# Patient Record
Sex: MALE | Race: BLACK OR AFRICAN AMERICAN | Employment: FULL TIME | URBAN - METROPOLITAN AREA
[De-identification: names, ages, dates, MRNs, and addresses within clinical notes are randomized per-mention and may not be internally consistent; named-entity substitution may affect disease eponyms.]

---

## 2019-06-18 ENCOUNTER — HOSPITAL ENCOUNTER (EMERGENCY)
Age: 39
Discharge: HOME | End: 2019-06-18
Attending: EMERGENCY MEDICINE
Payer: COMMERCIAL

## 2019-06-18 VITALS
TEMPERATURE: 98.3 F | OXYGEN SATURATION: 100 % | SYSTOLIC BLOOD PRESSURE: 140 MMHG | HEART RATE: 81 BPM | WEIGHT: 214 LBS | DIASTOLIC BLOOD PRESSURE: 82 MMHG | RESPIRATION RATE: 16 BRPM | BODY MASS INDEX: 27.46 KG/M2 | HEIGHT: 74 IN

## 2019-06-18 DIAGNOSIS — M79.10 MYALGIA: ICD-10-CM

## 2019-06-18 DIAGNOSIS — J02.0 STREPTOCOCCAL SORE THROAT: ICD-10-CM

## 2019-06-18 DIAGNOSIS — R05.9 COUGH: Primary | ICD-10-CM

## 2019-06-18 LAB
DIRECT EXAM: ABNORMAL
DIRECT EXAM: NORMAL
Lab: ABNORMAL
Lab: NORMAL
SPECIMEN DESCRIPTION: ABNORMAL
SPECIMEN DESCRIPTION: NORMAL

## 2019-06-18 PROCEDURE — 99283 EMERGENCY DEPT VISIT LOW MDM: CPT

## 2019-06-18 PROCEDURE — 6370000000 HC RX 637 (ALT 250 FOR IP): Performed by: STUDENT IN AN ORGANIZED HEALTH CARE EDUCATION/TRAINING PROGRAM

## 2019-06-18 PROCEDURE — 96372 THER/PROPH/DIAG INJ SC/IM: CPT

## 2019-06-18 PROCEDURE — 87880 STREP A ASSAY W/OPTIC: CPT

## 2019-06-18 PROCEDURE — 87804 INFLUENZA ASSAY W/OPTIC: CPT

## 2019-06-18 PROCEDURE — 6360000002 HC RX W HCPCS: Performed by: STUDENT IN AN ORGANIZED HEALTH CARE EDUCATION/TRAINING PROGRAM

## 2019-06-18 RX ORDER — IBUPROFEN 400 MG/1
600 TABLET ORAL ONCE
Status: COMPLETED | OUTPATIENT
Start: 2019-06-18 | End: 2019-06-18

## 2019-06-18 RX ADMIN — PENICILLIN G BENZATHINE AND PENICILLIN G PROCAINE 1.2 MILLION UNITS: 600000; 600000 INJECTION, SUSPENSION INTRAMUSCULAR at 11:16

## 2019-06-18 RX ADMIN — IBUPROFEN 600 MG: 400 TABLET, FILM COATED ORAL at 10:21

## 2019-06-18 ASSESSMENT — ENCOUNTER SYMPTOMS
EYE DISCHARGE: 0
PHOTOPHOBIA: 0
SORE THROAT: 1
CHOKING: 0
COUGH: 1
CHEST TIGHTNESS: 0
NAUSEA: 0
VOICE CHANGE: 0
CONSTIPATION: 0
FACIAL SWELLING: 0
SHORTNESS OF BREATH: 0
ABDOMINAL DISTENTION: 0
WHEEZING: 0
ABDOMINAL PAIN: 0
RHINORRHEA: 0
DIARRHEA: 0
APNEA: 0
STRIDOR: 0
VOMITING: 0
SINUS PAIN: 0

## 2019-06-18 ASSESSMENT — PAIN SCALES - GENERAL
PAINLEVEL_OUTOF10: 5
PAINLEVEL_OUTOF10: 5

## 2019-06-18 ASSESSMENT — PAIN DESCRIPTION - LOCATION: LOCATION: THROAT

## 2019-06-18 ASSESSMENT — PAIN DESCRIPTION - PAIN TYPE: TYPE: ACUTE PAIN

## 2019-06-18 ASSESSMENT — PAIN DESCRIPTION - DESCRIPTORS: DESCRIPTORS: SORE

## 2019-06-18 ASSESSMENT — PAIN DESCRIPTION - FREQUENCY: FREQUENCY: CONTINUOUS

## 2019-06-18 NOTE — ED PROVIDER NOTES
[facility]     Emergency Department     Faculty Attestation    I performed a history and physical examination of the patient and discussed management with the resident. I reviewed the resident´s note and agree with the documented findings and plan of care. Any areas of disagreement are noted on the chart. I was personally present for the key portions of any procedures. I have documented in the chart those procedures where I was not present during the key portions. I have reviewed the emergency nurses triage note. I agree with the chief complaint, past medical history, past surgical history, allergies, medications, social and family history as documented unless otherwise noted below. For Physician Assistant/ Nurse Practitioner cases/documentation I have personally evaluated this patient and have completed at least one if not all key elements of the E/M (history, physical exam, and MDM). Additional findings are as noted. chest clear, heart exam normal, mild erythema posterior pharynx with symmetrical swelling and no signs of peritonsillar abscess, normal voice, no drooling, no sublingual swelling, bilateral anterior cervical lymphadenopathy, no rash or meningeal signs. No pain to palpation of spleen. Neuro intact, no facial swelling. Pt does not appear ill. Plan is for strep screen.     Kaia Kay MD [address] [facility],[address]  Attending Physician         Placido Woods MD  06/18/19 9876

## 2019-06-18 NOTE — ED PROVIDER NOTES
[facility]  EMERGENCY DEPARTMENT ENCOUNTER  RESIDENT    Pt Name: Giselle Query  MRN: [id number]  Raphaelgfkerline 1980  Date of evaluation: 6/18/2019  PCP:  Edvin Lester MD    [address] [place] [address]       Chief Complaint   Patient presents with    Pharyngitis     x3 days    Generalized Body Aches         HISTORY OF PRESENT ILLNESS    Loc Hickman is a 37 y.o. male with a history of cluster migraines who presents with sore throat and myalgias for 3 days. Patient denies fevers, chills, night sweats, nausea or vomiting. Patient states that he has intermittent cough only when he is trying to force up sputum. Clear sputum. Has not noticed lymphadenopathy at this time. No sick contacts. No known allergies. Has not had his flu shot. REVIEW OF SYSTEMS       Review of Systems   Constitutional: Positive for fatigue. Negative for activity change, appetite change, chills, diaphoresis, fever and unexpected weight change. HENT: Positive for sore throat. Negative for congestion, drooling, ear discharge, ear pain, facial swelling, nosebleeds, postnasal drip, rhinorrhea, sinus pain, sneezing and voice change. Eyes: Negative for photophobia and discharge. Respiratory: Positive for cough. Negative for apnea, choking, chest tightness, shortness of breath, wheezing and stridor. Cardiovascular: Negative for chest pain, palpitations and leg swelling. Gastrointestinal: Negative for abdominal distention, abdominal pain, constipation, diarrhea, nausea and vomiting. Genitourinary: Negative for dysuria, enuresis, flank pain, frequency, hematuria and urgency. Neurological: Negative for dizziness, seizures, syncope, facial asymmetry, weakness, light-headedness, numbness and headaches. Hematological: Negative for adenopathy. PAST MEDICAL HISTORY    has a past medical history of Chest pain, ED (erectile dysfunction), and Headache(784.0).       SURGICAL HISTORY      has no past surgical history on file. CURRENT MEDICATIONS       Previous Medications    VARENICLINE (CHANTIX CONTINUING MONTH ANNELIESE) 1 MG TABLET    Take 1 tablet by mouth 2 times daily       ALLERGIES     is allergic to codeine. FAMILY HISTORY   has no family status information on file. family history includes High Blood Pressure in his maternal grandmother and mother. SOCIAL HISTORY      reports that he has been smoking. He has a 15.00 pack-year smoking history. He does not have any smokeless tobacco history on file. He reports that he does not drink alcohol or use drugs. PHYSICAL EXAM     INITIAL VITALS:  height is 6' 2\" (1.88 m) and weight is 214 lb (97.1 kg). His oral temperature is 98.3 °F (36.8 °C). His blood pressure is 140/82 (abnormal) and his pulse is 81. His respiration is 16 and oxygen saturation is 100%. Physical Exam   Constitutional: He is oriented to person, place, and time. Vital signs are normal. He appears well-developed and well-nourished. He is active. He does not appear ill. No distress. HENT:   Head: Normocephalic and atraumatic. Right Ear: External ear normal.   Left Ear: External ear normal.   Eyes: Pupils are equal, round, and reactive to light. Conjunctivae and EOM are normal. Right eye exhibits no discharge. Left eye exhibits no discharge. No scleral icterus. Neck: Normal range of motion. Pulmonary/Chest: Effort normal.   Abdominal: Soft. He exhibits no distension. There is no tenderness. There is no guarding. Musculoskeletal: Normal range of motion. He exhibits no edema, tenderness or deformity. Neurological: He is alert and oriented to person, place, and time. He has normal strength. No cranial nerve deficit. GCS eye subscore is 4. GCS verbal subscore is 5. GCS motor subscore is 6. Skin: Skin is warm and dry. Capillary refill takes less than 2 seconds. No rash noted. He is not diaphoretic. No erythema. Psychiatric: He has a normal mood and affect.  His behavior is normal. Nursing note and vitals reviewed. DIFFERENTIAL DIAGNOSIS:   Strep throat versus mono versus influenza versus URI of unknown viral etiology    DIAGNOSTIC RESULTS     EKG: All EKG's are interpreted by the Emergency Department Physician who either signs or Co-signs thischart in the absence of a cardiologist.      RADIOLOGY:   I directly visualized the following  imagesand reviewed the radiologist interpretations:  No orders to display           LABS:  Labs Reviewed   STREP SCREEN GROUP A THROAT - Abnormal; Notable for the following components:       Result Value    Direct Exam POSITIVE for Group A Streptococci (*)     All other components within normal limits   RAPID INFLUENZA A/B ANTIGENS             EMERGENCY DEPARTMENT COURSE:   Vitals:    Vitals:    06/18/19 1009   BP: (!) 140/82   Pulse: 81   Resp: 16   Temp: 98.3 °F (36.8 °C)   TempSrc: Oral   SpO2: 100%   Weight: 214 lb (97.1 kg)   Height: 6' 2\" (1.88 m)       Administered ibuprofen, flu swab, strep screen  Will reassess in 15 minutes  Rapid strep positive for group A strep  Bicillin C-R 1,200,000 units IM once-patient is agreeable to it                CONSULTS:  None      PROCEDURES:  Procedures        FINAL IMPRESSION      1. Cough    2. Viral syndrome    3. Myalgia    4. Viral pharyngitis            DISPOSITION/PLAN   DISPOSITION            PATIENT REFERRED TO:  No follow-up provider specified.     DISCHARGE MEDICATIONS:  New Prescriptions    No medications on file       (Please note that portions of this note were completed with a voice recognition program.  Efforts weremade to edit the dictations but occasionally words are mis-transcribed.)    Chad Trejo MD, [facility]  PGY-1 Resident              Chad Trejo  Resident  06/18/19 2031

## 2019-06-18 NOTE — ED NOTES
Patient to ed with c/o sore throat for three days and body aches starting on Sunday. Patient denies fever but states he had some chills. Patient denies taking any otc medication for this. Patient denies any N/V/D. Patient denies any cough or sob. Patient denies being around anyone sick.      Vero Estrada RN  06/18/19 8731

## 2023-04-18 ENCOUNTER — OFFICE VISIT (OUTPATIENT)
Dept: NEUROLOGY | Age: 43
End: 2023-04-18
Payer: MEDICAID

## 2023-04-18 VITALS
BODY MASS INDEX: 30.93 KG/M2 | WEIGHT: 241 LBS | HEIGHT: 74 IN | SYSTOLIC BLOOD PRESSURE: 129 MMHG | DIASTOLIC BLOOD PRESSURE: 79 MMHG | OXYGEN SATURATION: 98 % | HEART RATE: 80 BPM

## 2023-04-18 DIAGNOSIS — G44.009 CLUSTER HEADACHE, NOT INTRACTABLE, UNSPECIFIED CHRONICITY PATTERN: Primary | ICD-10-CM

## 2023-04-18 PROCEDURE — 99214 OFFICE O/P EST MOD 30 MIN: CPT | Performed by: STUDENT IN AN ORGANIZED HEALTH CARE EDUCATION/TRAINING PROGRAM

## 2023-04-18 RX ORDER — SUMATRIPTAN 100 MG/1
TABLET, FILM COATED ORAL
COMMUNITY
Start: 2014-01-20

## 2023-04-18 RX ORDER — LIDOCAINE HYDROCHLORIDE 10 MG/ML
5 INJECTION, SOLUTION INFILTRATION; PERINEURAL ONCE
Qty: 5 ML | Refills: 0 | Status: SHIPPED | OUTPATIENT
Start: 2023-04-18 | End: 2023-04-18

## 2023-04-18 RX ORDER — ASPIRIN 81 MG/1
81 TABLET, CHEWABLE ORAL PRN
COMMUNITY

## 2023-04-18 RX ORDER — TRIAMCINOLONE ACETONIDE 10 MG/ML
40 INJECTION, SUSPENSION INTRA-ARTICULAR; INTRALESIONAL ONCE
Qty: 4 ML | Refills: 0 | Status: SHIPPED | OUTPATIENT
Start: 2023-04-18 | End: 2023-04-18

## 2023-04-18 RX ORDER — ZOLMITRIPTAN 5 MG/1
SPRAY NASAL
COMMUNITY
Start: 2019-08-05

## 2023-04-18 RX ORDER — VERAPAMIL HYDROCHLORIDE 240 MG/1
1 CAPSULE, EXTENDED RELEASE ORAL
COMMUNITY
Start: 2018-11-29

## 2023-04-18 RX ORDER — DICLOFENAC POTASSIUM 50 MG/1
POWDER, FOR SOLUTION ORAL
COMMUNITY
Start: 2018-11-14

## 2023-04-18 RX ORDER — MAGNESIUM OXIDE 400 MG/1
TABLET ORAL
COMMUNITY
Start: 2018-11-28

## 2023-04-18 ASSESSMENT — ENCOUNTER SYMPTOMS
ABDOMINAL PAIN: 0
PHOTOPHOBIA: 1
EYE REDNESS: 1

## 2023-04-18 NOTE — PROGRESS NOTES
IV, VI - extra-ocular muscles full: no pupillary defect; no BONG, no nystagmus, left eye ptosis. V - normal facial sensation                                                               VII - normal facial symmetry                                                             VIII - intact hearing                                                                             IX, X - symmetrical palate                                                                  XI - symmetrical shoulder shrug                                                       XII - midline tongue without atrophy or fasciculation     Motor function  Normal muscle bulk and tone  Muscle strength: normal power 5/5  fine motor movements     Sensory function Intact to touch, pin prick, vibration, proprioception in bilateral upper and lower extremities. Cerebellar Intact fine motor movement. No involuntary movements or tremors     Reflex function Intact 2+ DTR and symmetric. Negative Babinski     Gait                  Normal station and gait           PRIOR TESTS AND IMAGING: Following images and Labs were reviewed by the examiner     MRI brain 2018: Unremarkable for any acute abnormality  MRA head and neck: Unremarkable for any abnormality. ASSESSMENT       Damion Hendricks Baumgarten 77-year-old male presents for evaluation of cluster headaches. History of chronic cluster headaches    PLAN:     Patient is already following with a different neurologist at [facility] outpatient with current regimen  Recommend to continue Emgality 300 mg at the onset of cluster. And then once monthly. Continue intranasal zolmitriptan 5 m puff at the onset of cluster headaches. May repeat after 2hr x 1. Max dose 2 doses in 24 hours. Will give occipital nerve blocks on 2023 once for now.   Patient has referral for occipital nerve blocks from his other neurologist.  Discussed with patient that in the long run patient can follow-up with his primary neurologist

## 2023-04-19 ENCOUNTER — OFFICE VISIT (OUTPATIENT)
Dept: NEUROLOGY | Age: 43
End: 2023-04-19

## 2023-04-19 VITALS
TEMPERATURE: 97.2 F | SYSTOLIC BLOOD PRESSURE: 129 MMHG | DIASTOLIC BLOOD PRESSURE: 79 MMHG | WEIGHT: 241 LBS | OXYGEN SATURATION: 98 % | BODY MASS INDEX: 30.93 KG/M2 | HEART RATE: 80 BPM | HEIGHT: 74 IN

## 2023-04-19 DIAGNOSIS — G44.009 CLUSTER HEADACHE, NOT INTRACTABLE, UNSPECIFIED CHRONICITY PATTERN: Primary | ICD-10-CM

## 2023-04-19 NOTE — PROGRESS NOTES
Procedure Note    Procedure: Left side Greater and Lesser Occipital Nerve Block (CPT code 73846, 09623)    Indications:  Cluster headaches     Informed consent was obtained (explaining the procedure and risks and benefits) from patient. The signed consent form was placed in the medical record. A time out was completed, verifying correct patient, procedure,site, positioning, and implants or special equipment. Patient's left occipital area was palpated to identify location of the greater and the lesser occipital nerve. Alcohol was applied topically to the skin. Using a 27 gauge needle (aspirating during insertion), 4 ml of 1% lidocaine (from a 20 ml bottle) and trimacinolone 1cc (40mg/ml( was aspirated. 2 ml was injected on the greater and lesser occipital nerve on the left side (directing needle to center, left and right of painful focus). Pressure with a gauze pad was held briefly upon the site of puncture to minimize bleeding and to further spread anaesthetic subcutaneously. There were no complications. Patient was comfortable and left without complaint. Procedure was performed with Dr. Tonny Freitas.

## 2023-04-19 NOTE — PROGRESS NOTES
[facility] [address] [place] [address]  Phone: [phone]    Procedure Note    Procedure: Left side Greater and Lesser Occipital Nerve Block (CPT code 76886, 72573)    Indications:  Cluster headaches     Informed consent was obtained (explaining the procedure and risks and benefits) from patient. The signed consent form was placed in the medical record. A time out was completed, verifying correct patient, procedure,site, positioning, and implants or special equipment. Patient's left occipital area was palpated to identify location of the greater and the lesser occipital nerve. Alcohol was applied topically to the skin. Using a 27 gauge needle (aspirating during insertion), 4 ml of 1% lidocaine (from a 20 ml bottle) and trimacinolone 1cc (40mg/ml( was aspirated. 2 ml was injected on the greater and lesser occipital nerve on the left side (directing needle to center, left and right of painful focus). Pressure with a gauze pad was held briefly upon the site of puncture to minimize bleeding and to further spread anaesthetic subcutaneously. There were no complications. Patient was comfortable and left without complaint. Procedure was performed with Dr. Sadiq Solorio.

## 2023-04-25 ENCOUNTER — TELEPHONE (OUTPATIENT)
Dept: NEUROLOGY | Age: 43
End: 2023-04-25

## 2023-10-25 ENCOUNTER — OFFICE VISIT (OUTPATIENT)
Dept: NEUROLOGY | Age: 43
End: 2023-10-25

## 2023-10-25 VITALS
HEART RATE: 77 BPM | HEIGHT: 74 IN | SYSTOLIC BLOOD PRESSURE: 137 MMHG | BODY MASS INDEX: 30.93 KG/M2 | OXYGEN SATURATION: 98 % | RESPIRATION RATE: 16 BRPM | WEIGHT: 241 LBS | DIASTOLIC BLOOD PRESSURE: 82 MMHG

## 2023-10-25 DIAGNOSIS — G44.029 CHRONIC CLUSTER HEADACHE, NOT INTRACTABLE: Primary | ICD-10-CM

## 2023-10-25 RX ORDER — ACETAMINOPHEN 500 MG
500 TABLET ORAL EVERY 6 HOURS PRN
COMMUNITY

## 2023-10-25 RX ORDER — TOPIRAMATE 25 MG/1
25 TABLET ORAL DAILY
Qty: 60 TABLET | Refills: 4 | Status: SHIPPED | OUTPATIENT
Start: 2023-10-25 | End: 2024-08-20

## 2023-10-25 RX ORDER — INDOMETHACIN 75 MG/1
CAPSULE, EXTENDED RELEASE ORAL
COMMUNITY
Start: 2023-10-01

## 2023-10-25 ASSESSMENT — ENCOUNTER SYMPTOMS
VOICE CHANGE: 0
WHEEZING: 0
CHEST TIGHTNESS: 0
VOMITING: 0
COLOR CHANGE: 0
TROUBLE SWALLOWING: 0
PHOTOPHOBIA: 0
NAUSEA: 0
SHORTNESS OF BREATH: 0

## 2023-10-25 NOTE — PATIENT INSTRUCTIONS
Start taking Topamax 25 mg nightly for the 1st week.      2nd week: 25 mg in AM and 25 mg in PM   3rd week: 25 mg in AM and 50 mg in PM   4th week: 50 mg in AM and 50 mg in PM   5th week: 50 mg in AM and 75 mg in PM   6th week: 75 mg in AM and 75 mg in PM    Please keep track of your symptoms by using a headache journal.

## 2023-10-25 NOTE — PROGRESS NOTES
XI - symmetrical shoulder shrug                                                       XII - midline tongue without atrophy or fasciculation     Motor function  Normal muscle bulk and tone  Muscle strength: normal power 5/5  fine motor movements     Sensory function Intact to touch, pin prick, vibration, proprioception in bilateral upper and lower extremities. Cerebellar Intact fine motor movement. No involuntary movements or tremors     Reflex function Intact 2+ DTR and symmetric. Negative Babinski     Gait                  Normal station and gait           PRIOR TESTS AND IMAGING: Following images and Labs were reviewed by the examiner             ASSESSMENT       Left-sided cluster headaches    42-year-old male with past medical history of recalcitrant cluster headaches on Emgality, indomethacin, and oxygen presents to neurology clinic for further evaluation of cluster headaches. Patient states Emgality has decreased in effectiveness. He has not tried Topamax in the past, which can be used for treatment of cluster headaches. He is reluctant to try lithium due to narrow therapeutic index, frequent lab checks, and possibility of side effects. He was educated on the side effects of Topamax including mental fog, decreased concentration, weight loss, decreased appetite, and increased risk of kidney stones. PLAN:     Start Topamax at 25 mg per night, and plan to increase by 25 mg every week for another dose of 75 mg twice daily. Continue oxygen and Emgality for abortive treatment. Continue Imitrex for abortive treatment. Continue indomethacin for preventative treatment. Follow up in the clinic in 3 months. Instructed patient to call the clinic if symptoms worsen or develop any new symptoms. Case discussed with attending, Dr. Mateo Garrett.      Mr. Mando Delgado received counseling on the following healthy behaviors: medical compliance, smoking

## 2024-01-31 ENCOUNTER — OFFICE VISIT (OUTPATIENT)
Dept: NEUROLOGY | Age: 44
End: 2024-01-31

## 2024-01-31 VITALS
SYSTOLIC BLOOD PRESSURE: 132 MMHG | WEIGHT: 212 LBS | HEIGHT: 74 IN | HEART RATE: 57 BPM | DIASTOLIC BLOOD PRESSURE: 77 MMHG | BODY MASS INDEX: 27.21 KG/M2

## 2024-01-31 DIAGNOSIS — G44.029 CHRONIC CLUSTER HEADACHE, NOT INTRACTABLE: Primary | ICD-10-CM

## 2024-01-31 NOTE — PROGRESS NOTES
[address] [facility] [address]  [place] [address]  P: [phone]  F: [phone]    NEUROLOGY CLINIC NOTE     PATIENT NAME: Dexter Paiz  PATIENT MRN: [id number]  PRIMARY CARE PHYSICIAN: Mary Salomon MD    HPI:      Dexter Paiz is a 43 y.o. right-handed -American male with past medical history of cluster headaches presents to the neurology clinic for follow-up.    As per the chart review, patient has been having left-sided headaches since 4077-8399, located right behind the eye, and has been worsening in intensity and increasing in frequency.  Headache is located in the left side, retro-orbital, quality is described as sharp and sometimes stabbing, associated factors of nausea, vomiting, photophobia, and phonophobia.  Intensity of 10/10, and there are no preceding aura symptoms.  Patient typically gets 15 days of headache per month.  Relieving factors including going to sleep, staying in a dark room.  He takes Emgality 300 mg at the onset of cluster headache.,  And then once monthly until the end of the cluster headaches.  Patient also takes intranasal zolmitriptan 5 mg for abortive treatment.  Patient was last seen on 4/18/2023, and received occipital nerve block on the left side on 4/19/2023.    Patient's cluster headache started on 7238-5373.  Patient has tried multiple medications in the past, including DHE for acute treatment, Depakote, and verapamil.  His cluster headaches typically occur during weather changes, and fluctuate every year to 1.5 years.  Patient's current symptoms started in March 2023, and initially started occurring every night with minimal improvement.  He received occipital nerve block in April 2023, and more recently in the beginning of October 2023 on left side of occiput with moderate improvement.  However, symptoms returned, and are persistent.  As of now, patient experiences 3 headache episodes per week, and all

## 2024-09-04 ENCOUNTER — APPOINTMENT (OUTPATIENT)
Dept: GENERAL RADIOLOGY | Age: 44
End: 2024-09-04
Payer: COMMERCIAL

## 2024-09-04 ENCOUNTER — HOSPITAL ENCOUNTER (EMERGENCY)
Age: 44
Discharge: HOME | End: 2024-09-04
Payer: COMMERCIAL

## 2024-09-04 VITALS
WEIGHT: 206 LBS | SYSTOLIC BLOOD PRESSURE: 131 MMHG | BODY MASS INDEX: 26.45 KG/M2 | DIASTOLIC BLOOD PRESSURE: 96 MMHG | TEMPERATURE: 99 F | RESPIRATION RATE: 16 BRPM | HEART RATE: 82 BPM | OXYGEN SATURATION: 99 %

## 2024-09-04 DIAGNOSIS — U07.1 COVID-19: Primary | ICD-10-CM

## 2024-09-04 LAB
ALBUMIN SERPL-MCNC: 3.6 G/DL (ref 3.5–5.2)
ALP SERPL-CCNC: 181 U/L (ref 40–129)
ALT SERPL-CCNC: 49 U/L (ref 5–41)
ANION GAP SERPL CALCULATED.3IONS-SCNC: 11 MMOL/L (ref 9–17)
AST SERPL-CCNC: 41 U/L
ATYPICAL LYMPHOCYTE ABSOLUTE COUNT: 0.43 K/UL
ATYPICAL LYMPHOCYTES: 3 %
BASOPHILS # BLD: 0.14 K/UL (ref 0–0.2)
BASOPHILS NFR BLD: 1 %
BILIRUB SERPL-MCNC: 0.4 MG/DL (ref 0.3–1.2)
BUN SERPL-MCNC: 7 MG/DL (ref 6–20)
BUN/CREAT SERPL: 9 (ref 9–20)
CALCIUM SERPL-MCNC: 8.8 MG/DL (ref 8.6–10.4)
CHLORIDE SERPL-SCNC: 102 MMOL/L (ref 98–107)
CO2 SERPL-SCNC: 24 MMOL/L (ref 20–31)
CREAT SERPL-MCNC: 0.8 MG/DL (ref 0.7–1.2)
EOSINOPHIL # BLD: 0.14 K/UL (ref 0–0.4)
EOSINOPHILS RELATIVE PERCENT: 1 % (ref 1–4)
ERYTHROCYTE [DISTWIDTH] IN BLOOD BY AUTOMATED COUNT: 14.7 % (ref 11.8–14.4)
FLUAV RNA RESP QL NAA+PROBE: NOT DETECTED
FLUBV RNA RESP QL NAA+PROBE: NOT DETECTED
GFR, ESTIMATED: >90 ML/MIN/1.73M2
GLUCOSE SERPL-MCNC: 82 MG/DL (ref 70–99)
HCT VFR BLD AUTO: 43.6 % (ref 40.7–50.3)
HGB BLD-MCNC: 14.6 G/DL (ref 13–17)
IMM GRANULOCYTES # BLD AUTO: 0 K/UL (ref 0–0.3)
IMM GRANULOCYTES NFR BLD: 0 %
LIPASE SERPL-CCNC: 19 U/L (ref 13–60)
LYMPHOCYTES NFR BLD: 10.3 K/UL (ref 1–4.8)
LYMPHOCYTES RELATIVE PERCENT: 72 % (ref 24–44)
MAGNESIUM SERPL-MCNC: 2.2 MG/DL (ref 1.6–2.6)
MCH RBC QN AUTO: 29.7 PG (ref 25.2–33.5)
MCHC RBC AUTO-ENTMCNC: 33.5 G/DL (ref 28.4–34.8)
MCV RBC AUTO: 88.6 FL (ref 82.6–102.9)
MONOCYTES NFR BLD: 1.14 K/UL (ref 0.2–0.8)
MONOCYTES NFR BLD: 8 % (ref 1–7)
NEUTROPHILS NFR BLD: 15 % (ref 36–66)
NEUTS SEG NFR BLD: 2.15 K/UL (ref 1.8–7.7)
NRBC BLD-RTO: 0 PER 100 WBC
PLATELET # BLD AUTO: 182 K/UL (ref 138–453)
PMV BLD AUTO: 8.9 FL (ref 8.1–13.5)
POTASSIUM SERPL-SCNC: 4.6 MMOL/L (ref 3.7–5.3)
PROT SERPL-MCNC: 7.3 G/DL (ref 6.4–8.3)
RBC # BLD AUTO: 4.92 M/UL (ref 4.21–5.77)
SARS-COV-2 RNA RESP QL NAA+PROBE: DETECTED
SODIUM SERPL-SCNC: 137 MMOL/L (ref 135–144)
SOURCE: ABNORMAL
SPECIMEN DESCRIPTION: ABNORMAL
SPECIMEN SOURCE: NORMAL
STREP A, MOLECULAR: NEGATIVE
WBC OTHER # BLD: 14.3 K/UL (ref 3.5–11.3)

## 2024-09-04 PROCEDURE — 83735 ASSAY OF MAGNESIUM: CPT

## 2024-09-04 PROCEDURE — 99284 EMERGENCY DEPT VISIT MOD MDM: CPT

## 2024-09-04 PROCEDURE — 6370000000 HC RX 637 (ALT 250 FOR IP)

## 2024-09-04 PROCEDURE — 87651 STREP A DNA AMP PROBE: CPT

## 2024-09-04 PROCEDURE — 2580000003 HC RX 258

## 2024-09-04 PROCEDURE — 80053 COMPREHEN METABOLIC PANEL: CPT

## 2024-09-04 PROCEDURE — 83690 ASSAY OF LIPASE: CPT

## 2024-09-04 PROCEDURE — 71045 X-RAY EXAM CHEST 1 VIEW: CPT

## 2024-09-04 PROCEDURE — 87636 SARSCOV2 & INF A&B AMP PRB: CPT

## 2024-09-04 PROCEDURE — 85025 COMPLETE CBC W/AUTO DIFF WBC: CPT

## 2024-09-04 RX ORDER — 0.9 % SODIUM CHLORIDE 0.9 %
1000 INTRAVENOUS SOLUTION INTRAVENOUS ONCE
Status: COMPLETED | OUTPATIENT
Start: 2024-09-04 | End: 2024-09-04

## 2024-09-04 RX ORDER — GUAIFENESIN AND DEXTROMETHORPHAN HYDROBROMIDE 20; 400 MG/1; MG/1
1 TABLET ORAL EVERY 4 HOURS PRN
Qty: 20 TABLET | Refills: 0 | Status: SHIPPED | OUTPATIENT
Start: 2024-09-04

## 2024-09-04 RX ADMIN — SODIUM CHLORIDE 1000 ML: 9 INJECTION, SOLUTION INTRAVENOUS at 12:50

## 2024-09-04 RX ADMIN — GUAIFENESIN AND DEXTROMETHORPHAN HYDROBROMIDE 1 TABLET: 600; 30 TABLET, EXTENDED RELEASE ORAL at 13:24

## 2024-09-04 ASSESSMENT — ENCOUNTER SYMPTOMS
WHEEZING: 0
BACK PAIN: 0
COLOR CHANGE: 0
SORE THROAT: 1
ABDOMINAL PAIN: 0
CHEST TIGHTNESS: 0
NAUSEA: 0
SHORTNESS OF BREATH: 0
COUGH: 1
VOMITING: 0

## 2024-09-04 ASSESSMENT — PAIN - FUNCTIONAL ASSESSMENT: PAIN_FUNCTIONAL_ASSESSMENT: NONE - DENIES PAIN

## 2024-09-04 NOTE — ED PROVIDER NOTES
COVID-19.  Chest x-ray shows no pneumonia.  Patient was given IV fluids and updated with results of his labs and imaging.  Will be advised to utilize symptomatic treatment.  Refer to [facility] for quarantine guidelines.  Patient otherwise safe and stable for discharge.  He is agreeable with this plan.    Amount and/or Complexity of Data Reviewed  Labs: ordered.  Radiology: ordered.    Risk  OTC drugs.  Prescription drug management.              RADIOLOGY:         Interpretation per the Radiologist below, if available at the time of this note:    XR CHEST 1 VIEW   Final Result   No acute process.               EMERGENCY DEPARTMENT COURSE:           Labs Reviewed   COVID-19 & INFLUENZA COMBO - Abnormal; Notable for the following components:       Result Value    SARS-CoV-2 RNA, RT PCR DETECTED (*)     All other components within normal limits   CBC WITH AUTO DIFFERENTIAL - Abnormal; Notable for the following components:    WBC 14.3 (*)     RDW 14.7 (*)     Neutrophils % 15 (*)     Lymphocytes % 72 (*)     Monocytes % 8 (*)     Lymphocytes Absolute 10.30 (*)     Monocytes Absolute 1.14 (*)     All other components within normal limits   COMPREHENSIVE METABOLIC PANEL - Abnormal; Notable for the following components:    Alkaline Phosphatase 181 (*)     ALT 49 (*)     AST 41 (*)     All other components within normal limits   RAPID STREP SCREEN   LIPASE   MAGNESIUM       PROCEDURES:    Procedures    CONSULTS:  None      FINAL IMPRESSION      1. COVID-19          DISPOSITION / PLAN     DISPOSITION Decision To Discharge 09/04/2024 01:59:20 PM  Condition at Disposition: Stable      PATIENT REFERRED TO:  Mary Salomon MD  [address] [facility], [address]  [facility] [address] [phone]    Schedule an appointment as soon as possible for a visit   To follow-up on this visit      DISCHARGE MEDICATIONS:  Current Discharge Medication List        START taking these medications    Details   dextromethorphan-guaiFENesin  MG

## 2024-09-04 NOTE — DISCHARGE INSTR - COC
Continuity of Care Form    Patient Name: Dexter Paiz   :  1980  MRN:  [id number]    Admit date:  2024  Discharge date:  ***    Code Status Order: No Order   Advance Directives:   Advance Care Flowsheet Documentation             Admitting Physician:  No admitting provider for patient encounter.  PCP: Mary Salomon MD    Discharging Nurse: ***  Discharging Hospital Unit/Room#: [facility]20/20  Discharging Unit Phone Number: ***    Emergency Contact:   Extended Emergency Contact Information  Primary Emergency Contact: Linda Paiz   [facility]  Home Phone: [phone]  Relation: Parent  Secondary Emergency Contact: Mini suarez  Mobile Phone: [phone]  Relation: Spouse    Past Surgical History:  History reviewed. No pertinent surgical history.    Immunization History:   Immunization History   Administered Date(s) Administered    COVID-19, PFIZER PURPLE top, DILUTE for use, (age 12 y+), 30mcg/0.3mL 2021, 2021       Active Problems:  There is no problem list on file for this patient.      Isolation/Infection:   Isolation            No Isolation          Patient Infection Status       Infection Onset Added Last Indicated Last Indicated By Review Planned Expiration Resolved Resolved By    COVID-19 24 COVID-19 & Influenza Combo 24                         Nurse Assessment:  Last Vital Signs: BP (!) 144/82   Pulse 83   Temp 99 °F (37.2 °C) (Oral)   Resp 16   Wt 93.4 kg (206 lb)   SpO2 99%   BMI 26.45 kg/m²     Last documented pain score (0-10 scale):    Last Weight:   Wt Readings from Last 1 Encounters:   24 93.4 kg (206 lb)     Mental Status:  {IP PT MENTAL STATUS:}    IV Access:  { DANNY IV ACCESS:997250449}    Nursing Mobility/ADLs:  Walking   {CHP DME ADLs:013286562}  Transfer  {CHP DME ADLs:960700477}  Bathing  {CHP DME ADLs:419748244}  Dressing  {CHP DME ADLs:763292489}  Toileting  {CHP DME ADLs:631480612}  Feeding  {CHP DME

## 2025-03-12 ENCOUNTER — TELEPHONE (OUTPATIENT)
Dept: NEUROLOGY | Age: 45
End: 2025-03-12

## 2025-03-12 ENCOUNTER — OFFICE VISIT (OUTPATIENT)
Dept: NEUROLOGY | Age: 45
End: 2025-03-12

## 2025-03-12 VITALS
HEART RATE: 82 BPM | SYSTOLIC BLOOD PRESSURE: 126 MMHG | BODY MASS INDEX: 26.15 KG/M2 | DIASTOLIC BLOOD PRESSURE: 73 MMHG | HEIGHT: 74 IN | WEIGHT: 203.8 LBS

## 2025-03-12 DIAGNOSIS — G44.029 CHRONIC CLUSTER HEADACHE, NOT INTRACTABLE: Primary | ICD-10-CM

## 2025-03-12 ASSESSMENT — ENCOUNTER SYMPTOMS
VOMITING: 0
SHORTNESS OF BREATH: 0
NAUSEA: 0
COLOR CHANGE: 0
CHEST TIGHTNESS: 0
VOICE CHANGE: 0
WHEEZING: 0
PHOTOPHOBIA: 0
TROUBLE SWALLOWING: 0

## 2025-03-12 NOTE — TELEPHONE ENCOUNTER
----- Message from Dr. Lizet Noriega DO sent at 3/12/2025  3:41 PM EDT -----  Regarding: ONB  This is a patient with intractable cluster headache I saw with Dr. Garcia today.  [facility] is recommending occipital nerve block for him asap.  Can we add him in next week at [facility] if insurance will approve?     Thanks,     AVB

## 2025-03-12 NOTE — PROGRESS NOTES
with no confusion, speech or language problems; no hallucinations or delusions     Cranial nerves   II - visual fields intact to confrontation                                                III, IV, VI - extra-ocular muscles full: no pupillary defect; no BONG, no nystagmus, no ptosis   V - normal facial sensation                                                               VII - normal facial symmetry                                                             VIII - intact hearing                                                                             IX, X - symmetrical palate                                                                  XI - symmetrical shoulder shrug                                                       XII - midline tongue without atrophy or fasciculation     Motor function  Normal muscle bulk and tone  Muscle strength: normal power 5/5  fine motor movements     Sensory function Intact to touch, pin prick, vibration, proprioception in bilateral upper and lower extremities.      Cerebellar Intact fine motor movement. No involuntary movements or tremors     Reflex function Intact 2+ DTR and symmetric. Negative Babinski     Gait                  Normal station and gait           PRIOR TESTS AND IMAGING: Following images and Labs were reviewed by the examiner             ASSESSMENT       Left-sided cluster headaches    43-year-old male with past medical history of recalcitrant cluster headaches on Emgality, indomethacin, and oxygen presents to neurology clinic for further evaluation of cluster headaches. He is getting Emgality monthly for cluster headache prophylaxis and uses imitrex, oxygen via nonbreather 15 L, and hydration for abortive treatment which typically works after 20 minutes within implementation.      PLAN:     C/t Emgality monthly for preventative treatment   Continue oxygen, sumatriptan, and tylenol PRN for abortive treatment.   Occipital Nerve Block for additional analgesia

## 2025-03-17 NOTE — TELEPHONE ENCOUNTER
Patient call the office today Re: ONB - I informed him Nursing staff will reach out to him when there approval for ONB .   Per the patient states he should get scheduled here at [facility] with Headache Specialist  ?   Should this patient get schedule here at [facility] [address]   Please let him Know -  (Clinic PT)

## 2025-03-26 NOTE — TELEPHONE ENCOUNTER
I contacted [facility] and spoke with Nathalia. She states that there is no prior authorization required.     Date of 3/26/2025 is to be used as the reference number for the call.

## 2025-04-09 ENCOUNTER — TELEPHONE (OUTPATIENT)
Dept: NEUROLOGY | Age: 45
End: 2025-04-09

## 2025-04-09 DIAGNOSIS — G44.009 CLUSTER HEADACHE, NOT INTRACTABLE, UNSPECIFIED CHRONICITY PATTERN: Primary | ICD-10-CM

## 2025-04-09 RX ORDER — METHYLPREDNISOLONE 4 MG/1
TABLET ORAL
Qty: 1 KIT | Refills: 0 | Status: SHIPPED | OUTPATIENT
Start: 2025-04-09 | End: 2025-04-15

## 2025-04-09 NOTE — TELEPHONE ENCOUNTER
Patient stopped in the office stating that he is scheduled to do a nerve block with Dr Noriega in June. He states that his cluster headaches are getting worse and when he seen Dr Garcia a steroid pack was discussed and was wondering if that can called into [facility] at [address] [place] [address]

## 2025-05-19 ENCOUNTER — PROCEDURE VISIT (OUTPATIENT)
Dept: NEUROLOGY | Age: 45
End: 2025-05-19
Payer: COMMERCIAL

## 2025-05-19 DIAGNOSIS — G43.709 CHRONIC MIGRAINE W/O AURA W/O STATUS MIGRAINOSUS, NOT INTRACTABLE: Primary | ICD-10-CM

## 2025-05-19 DIAGNOSIS — G44.021 INTRACTABLE CHRONIC CLUSTER HEADACHE: ICD-10-CM

## 2025-05-19 DIAGNOSIS — M54.81 BILATERAL OCCIPITAL NEURALGIA: ICD-10-CM

## 2025-05-19 DIAGNOSIS — R00.2 PALPITATIONS: ICD-10-CM

## 2025-05-19 PROCEDURE — 64450 NJX AA&/STRD OTHER PN/BRANCH: CPT | Performed by: PSYCHIATRY & NEUROLOGY

## 2025-05-19 PROCEDURE — 99214 OFFICE O/P EST MOD 30 MIN: CPT | Performed by: PSYCHIATRY & NEUROLOGY

## 2025-05-19 PROCEDURE — 64405 NJX AA&/STRD GR OCPL NRV: CPT | Performed by: PSYCHIATRY & NEUROLOGY

## 2025-05-19 RX ORDER — DIPHENHYDRAMINE HYDROCHLORIDE 50 MG/ML
25 INJECTION, SOLUTION INTRAMUSCULAR; INTRAVENOUS ONCE
OUTPATIENT
Start: 2025-05-19 | End: 2025-05-19

## 2025-05-19 RX ORDER — HYDROCORTISONE SODIUM SUCCINATE 100 MG/2ML
100 INJECTION INTRAMUSCULAR; INTRAVENOUS
OUTPATIENT
Start: 2025-05-19

## 2025-05-19 RX ORDER — FAMOTIDINE 10 MG/ML
20 INJECTION, SOLUTION INTRAVENOUS
OUTPATIENT
Start: 2025-05-19

## 2025-05-19 RX ORDER — DIPHENHYDRAMINE HYDROCHLORIDE 50 MG/ML
50 INJECTION, SOLUTION INTRAMUSCULAR; INTRAVENOUS
OUTPATIENT
Start: 2025-05-19

## 2025-05-19 RX ORDER — METHYLPREDNISOLONE SODIUM SUCCINATE 40 MG/ML
40 INJECTION INTRAMUSCULAR; INTRAVENOUS ONCE
Status: COMPLETED | OUTPATIENT
Start: 2025-05-19 | End: 2025-05-19

## 2025-05-19 RX ORDER — LIDOCAINE HYDROCHLORIDE 20 MG/ML
5 INJECTION, SOLUTION INFILTRATION; PERINEURAL ONCE
Status: COMPLETED | OUTPATIENT
Start: 2025-05-19 | End: 2025-05-19

## 2025-05-19 RX ORDER — DIHYDROERGOTAMINE MESYLATE 1 MG/ML
0.5 INJECTION, SOLUTION INTRAMUSCULAR; INTRAVENOUS; SUBCUTANEOUS ONCE
OUTPATIENT
Start: 2025-05-19

## 2025-05-19 RX ORDER — PREDNISONE 20 MG/1
TABLET ORAL
Qty: 18 TABLET | Refills: 0 | Status: SHIPPED | OUTPATIENT
Start: 2025-05-19

## 2025-05-19 RX ORDER — SODIUM CHLORIDE 9 MG/ML
5-250 INJECTION, SOLUTION INTRAVENOUS PRN
OUTPATIENT
Start: 2025-05-19

## 2025-05-19 RX ORDER — ONDANSETRON 2 MG/ML
8 INJECTION INTRAMUSCULAR; INTRAVENOUS
OUTPATIENT
Start: 2025-05-19

## 2025-05-19 RX ORDER — ALBUTEROL SULFATE 90 UG/1
4 INHALANT RESPIRATORY (INHALATION) PRN
OUTPATIENT
Start: 2025-05-19

## 2025-05-19 RX ORDER — EPINEPHRINE 1 MG/ML
0.3 INJECTION, SOLUTION, CONCENTRATE INTRAVENOUS PRN
OUTPATIENT
Start: 2025-05-19

## 2025-05-19 RX ORDER — ACETAMINOPHEN 325 MG/1
650 TABLET ORAL
OUTPATIENT
Start: 2025-05-19

## 2025-05-19 RX ORDER — HEPARIN SODIUM (PORCINE) LOCK FLUSH IV SOLN 100 UNIT/ML 100 UNIT/ML
500 SOLUTION INTRAVENOUS PRN
OUTPATIENT
Start: 2025-05-19

## 2025-05-19 RX ORDER — SODIUM CHLORIDE 9 MG/ML
INJECTION, SOLUTION INTRAVENOUS CONTINUOUS
OUTPATIENT
Start: 2025-05-19

## 2025-05-19 RX ORDER — SODIUM CHLORIDE 0.9 % (FLUSH) 0.9 %
5-40 SYRINGE (ML) INJECTION PRN
OUTPATIENT
Start: 2025-05-19

## 2025-05-19 RX ADMIN — LIDOCAINE HYDROCHLORIDE 5 ML: 20 INJECTION, SOLUTION INFILTRATION; PERINEURAL at 09:20

## 2025-05-19 RX ADMIN — METHYLPREDNISOLONE SODIUM SUCCINATE 40 MG: 40 INJECTION INTRAMUSCULAR; INTRAVENOUS at 09:21

## 2025-05-19 NOTE — PROGRESS NOTES
Procedure Note     Procedure: Left greater and lesser Occipital Nerve Block (CPT code 61142, 19629)     Indications:  Severe left occipital neuralgia (ICD 10 M54.81)     Informed consent was obtained (explaining the procedure and risks and benefits) from patient. The signed consent form was placed in the medical record.     A time out was completed, verifying correct patient, procedure,site, positioning, and implants or special equipment.     Patient's left occipital area was palpated to identify location of the greater and the lesser occipital nerve. Using a 10 ml syringe, 7 ml of 2% lidocaine (from a 20 ml bottle) and 1 ml (40 mg) of methylprednisolone was aspirated. Alcohol was applied topically to the skin. Using a 27 gauge needle (aspirating during insertion),  2 ml was injected on the greater and lesser occipital nerve on the left side. Pressure with a gauze pad was held briefly upon the site of puncture to minimize bleeding and to further spread anaesthetic subcutaneously. There were no complications.  Patient was comfortable and left without complaint.     Empty vial of methylprednisolone was discarded.      Sincerely,     Lizet Noriega DO  Neurology  Director of Multiple Sclerosis & Neuroimmunology  [facility] [place]    
3rd week: 25 mg in AM and 50 mg in PM 4th week: 50 mg in AM and 50 mg in PM 5th week: 50 mg in AM and 75 mg in PM 6th week: 75 mg in AM and 75 mg in PM    varenicline (CHANTIX CONTINUING MONTH PAK) 1 mg, Oral, 2 TIMES DAILY    verapamil (VERELAN) 240 MG extended release capsule 1 capsule    ZOLMitriptan (ZOMIG) 5 MG nasal solution One puff at the onset of cluster headache.  May repeat after 2 hr x1.  Max 2 doses in 24 hr.        Allergies   Allergen Reactions    Codeine         REVIEW OF SYSTEMS:     Review of Systems   All other systems reviewed and are negative.          NEUROLOGICAL EXAMINATION:     GENERAL PHYSICAL EXAM  General Appearance: No acute distress. Conversant.  Well-groomed.    Eyes: Anicteric sclerae. Moist conjunctivae. No lid-lag  HENT: Atraumatic. Oropharynx clear. Moist mucous membranes.  Neck: Trachea midline. Neck supple.   Lungs: Normal respiratory effort. No intercostal retractions. No dyspnea noted  Abdomen: Soft, non-tender, no masses noted.   Extremities: No clubbing. No cyanosis.No peripheral edema.  Skin: Normal temperature and texture, no rash, no ulcers noted  Pysch: No pseudobulbar affect noted. Euthymic      NEUROLOGICAL EXAM:  Mental status    Alert and oriented x 3; intact memory with no confusion, speech or language problems; no hallucinations or delusions     Cranial nerves    II - visual fields intact to confrontation  III, IV, VI - extra-ocular muscles full: no pupillary defect; no BONG, no nystagmus, no ptosis   V - normal facial sensation                                                               VII - normal facial symmetry                                                             VIII - intact hearing                                                                             IX, X - symmetrical palate                                                                  XI - symmetrical shoulder shrug                                                       XII - tongue

## 2025-05-20 ENCOUNTER — TELEPHONE (OUTPATIENT)
Dept: INFUSION THERAPY | Facility: MEDICAL CENTER | Age: 45
End: 2025-05-20

## 2025-05-20 NOTE — TELEPHONE ENCOUNTER
I TRIED TO CALL [facility] TO SCHEDULE HIS MIGRAINE INFUSIONS AND HAD TO LEAVE A MESSAGE TO CALL THE OFFICE TO SCHEDULE.

## 2025-05-22 ENCOUNTER — TELEPHONE (OUTPATIENT)
Age: 45
End: 2025-05-22

## 2025-05-22 NOTE — TELEPHONE ENCOUNTER
I TRIED TO CALL [facility] TO SCHEDULE HIS MIGRAINE INFUSION AND HAD TO LEAVE A MESSAGE TO CALL THE OFFICE TO SCHEDULE.

## 2025-05-30 NOTE — TELEPHONE ENCOUNTER
I TRIED TO CALL [facility] TO SCHEDULE HIS INFUSION AND HAD TO LEAVE A MESSAGE TO CALL THE OFFICE TO SCHEDULE. IF PT CALLS TO SCHEDULE, PT CAN BE SCHEDULED.

## 2025-06-10 ENCOUNTER — CLINICAL DOCUMENTATION (OUTPATIENT)
Facility: HOSPITAL | Age: 45
End: 2025-06-10

## 2025-06-10 NOTE — PROGRESS NOTES
Patient Assistance    Insurance and treatment reviewed; no assistance required.

## 2025-06-24 ENCOUNTER — TELEPHONE (OUTPATIENT)
Dept: NEUROLOGY | Age: 45
End: 2025-06-24

## 2025-06-24 DIAGNOSIS — G44.021 INTRACTABLE CHRONIC CLUSTER HEADACHE: ICD-10-CM

## 2025-06-24 RX ORDER — PREDNISONE 20 MG/1
TABLET ORAL
Qty: 18 TABLET | Refills: 0 | Status: SHIPPED | OUTPATIENT
Start: 2025-06-24

## 2025-06-24 NOTE — TELEPHONE ENCOUNTER
Patient called stating the prednisone that was prescribed at his last appt got thrown out shortly after being prescribed. 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 He stated he only took 1 day of predisone. He is requesting a refill for this as he is having a flare up with his headaches.     Patient calling for refill of Prednisone 20mg.      Medication active on med list yes      Date of last fill: 5/19/25 #18 R-0 verified on 6/24/2025   verified by VS LPN      Date of last appointment 5/19/25    Next Visit Date:  7/14/2025

## 2025-07-11 NOTE — PROGRESS NOTES
[facility] NEUROLOGY SPECIALIST  [address]  [place] [address]  Dept: [phone]    PATIENT NAME: Dexter Paiz  PATIENT MRN: [id number]  PRIMARY CARE PHYSICIAN: Mary Salomon MD    HPI:      Dexter Paiz is a 45 y.o. right-handed -American male with past medical history of cluster headaches, chronic migraine, and left occipital neuralgia presents to the neurology clinic for follow-up.     As per the chart review, patient has been having left-sided headaches since [id number], located right behind the eye, and has been worsening in intensity and increasing in frequency.  Headache is located in the left side, retro-orbital, quality is described as sharp and sometimes stabbing, associated factors of nausea, vomiting, photophobia, and phonophobia.  Intensity of 10/10, and there are no preceding aura symptoms.  Patient typically gets 15 days of headache per month.  Relieving factors including going to sleep, staying in a dark room.  He takes Emgality 300 mg at the onset of cluster headache.,  And then once monthly until the end of the cluster headaches.  Patient also takes intranasal zolmitriptan 5 mg for abortive treatment.  Patient was last seen on 4/18/2023, and received occipital nerve block on the left side on 4/19/2023.     Patient's cluster headache started on [id number].  Patient has tried multiple medications in the past, including DHE for acute treatment, Depakote, and verapamil.  His cluster headaches typically occur during weather changes, and fluctuate every year to 1.5 years.  Patient's current symptoms started in March 2023, and initially started occurring every night with minimal improvement.  He received occipital nerve block in April 2023, and more recently in the beginning of October 2023 on left side of occiput with moderate improvement.  However, symptoms returned, and are persistent.  As of now, patient experiences 3 headache episodes per week, and all occurred

## 2025-07-14 ENCOUNTER — OFFICE VISIT (OUTPATIENT)
Dept: NEUROLOGY | Age: 45
End: 2025-07-14
Payer: COMMERCIAL

## 2025-07-14 ENCOUNTER — TELEPHONE (OUTPATIENT)
Dept: NEUROLOGY | Age: 45
End: 2025-07-14

## 2025-07-14 VITALS
DIASTOLIC BLOOD PRESSURE: 84 MMHG | SYSTOLIC BLOOD PRESSURE: 153 MMHG | HEIGHT: 74 IN | BODY MASS INDEX: 25.15 KG/M2 | WEIGHT: 196 LBS | HEART RATE: 83 BPM

## 2025-07-14 DIAGNOSIS — M54.81 BILATERAL OCCIPITAL NEURALGIA: ICD-10-CM

## 2025-07-14 DIAGNOSIS — G44.021 INTRACTABLE CHRONIC CLUSTER HEADACHE: Primary | ICD-10-CM

## 2025-07-14 PROCEDURE — 99214 OFFICE O/P EST MOD 30 MIN: CPT | Performed by: PSYCHIATRY & NEUROLOGY

## 2025-07-14 RX ORDER — SUMATRIPTAN SUCCINATE 100 MG/1
100 TABLET ORAL
Qty: 12 TABLET | Refills: 5 | Status: SHIPPED | OUTPATIENT
Start: 2025-07-14

## 2025-07-14 NOTE — TELEPHONE ENCOUNTER
O2 order and 7/14/25 office note successfully faxed to [facility] at [phone].  Call placed to Mr. Paiz and a message left on his VM asking for a return call.

## 2025-07-14 NOTE — TELEPHONE ENCOUNTER
Writer spoke with Mr. Owenell today at his office visit.  Patient confirmed that he uses [facility] for his O2.  Writer advised that we would keep him updated once things were sent to [facility].

## 2025-07-15 NOTE — TELEPHONE ENCOUNTER
Writer spoke with Mr. Paiz this afternoon and this information was given.  Writer asked that he call if there were any problems.

## 2025-08-14 ENCOUNTER — TELEPHONE (OUTPATIENT)
Dept: NEUROLOGY | Age: 45
End: 2025-08-14

## 2025-08-14 DIAGNOSIS — G44.021 INTRACTABLE CHRONIC CLUSTER HEADACHE: ICD-10-CM

## 2025-08-15 RX ORDER — PREDNISONE 20 MG/1
TABLET ORAL
Qty: 18 TABLET | Refills: 0 | Status: SHIPPED | OUTPATIENT
Start: 2025-08-15